# Patient Record
Sex: MALE | Race: WHITE
[De-identification: names, ages, dates, MRNs, and addresses within clinical notes are randomized per-mention and may not be internally consistent; named-entity substitution may affect disease eponyms.]

---

## 2023-09-18 ENCOUNTER — HOSPITAL ENCOUNTER
Dept: HOSPITAL 101 - MRI | Age: 23
Discharge: HOME | End: 2023-09-18
Payer: COMMERCIAL

## 2023-09-18 DIAGNOSIS — G43.909: Primary | ICD-10-CM

## 2023-09-18 DIAGNOSIS — R26.89: ICD-10-CM

## 2023-09-18 PROCEDURE — 70551 MRI BRAIN STEM W/O DYE: CPT

## 2023-09-18 NOTE — MR_ITS
The [address] [address] 
     [phone] 
  
  
Patient Name: 
KOFI LONDON 
  
MRN: [facility]:[id number]    YOB: 2000    Sex: M 
Assigned Patient Location: MRI 
Current Patient Location: MRI 
Accession/Order Number: [id number] 
Exam Date: 9/18/2023  08:57    Report Date: 9/18/2023  10:07 
  
At the request of: 
THOMAS BOYLE   
  
Procedure:  MR head/brain wo con 
  
MR head/brain wo con, 9/18/2023 8:57 AM EDT 
  
INDICATION: Migraine G43.909, Balance problem R26.89 
  
COMPARISON: There is no appropriate prior study for comparison. 
  
TECHNIQUE: Multiplanar, multisequential MRI images of brain were obtained  
without injection of contrast.  
  
FINDINGS: 
  
The cerebral sulci as well as ventricular system are appropriate for age. 
  
There is no restricted diffusion.  
  
There is no intracranial mass, mass effect, midline shift, intra or  
extra-axial  
fluid collection or large hemorrhage.  
  
Normal flow-void in the intracranial vessels is noted. 
Retention cyst within the right maxillary sinus are noted. 
The visualized portions of orbits, mastoid air cells as well as remainder of  
paranasal sinuses are unremarkable.  
  
ORDER #: [id number] MR/MR head/brain wo con  
IMPRESSION:  
   
Normal MRI of brain.  
   
   
Electronically authenticated by: CHANTAL MALAGON   Date: 9/18/2023  10:07

## 2023-11-14 ENCOUNTER — HOSPITAL ENCOUNTER (EMERGENCY)
Age: 23
Discharge: HOME | End: 2023-11-14
Payer: COMMERCIAL

## 2023-11-14 VITALS — SYSTOLIC BLOOD PRESSURE: 126 MMHG | HEART RATE: 80 BPM | DIASTOLIC BLOOD PRESSURE: 80 MMHG | RESPIRATION RATE: 18 BRPM

## 2023-11-14 VITALS
TEMPERATURE: 97.88 F | HEART RATE: 75 BPM | OXYGEN SATURATION: 100 % | RESPIRATION RATE: 18 BRPM | DIASTOLIC BLOOD PRESSURE: 97 MMHG | SYSTOLIC BLOOD PRESSURE: 153 MMHG

## 2023-11-14 VITALS — BODY MASS INDEX: 23.7 KG/M2

## 2023-11-14 DIAGNOSIS — R42: Primary | ICD-10-CM

## 2023-11-14 DIAGNOSIS — Z79.899: ICD-10-CM

## 2023-11-14 LAB
ADD MANUAL DIFF: NO
ANION GAP: 8.9
BLOOD UREA NITROGEN: 14 MG/DL (ref 7–18)
CALCIUM: 9.4 MG/DL (ref 8.5–10.1)
CARBON DIOXIDE: 29.9 MMOL/L (ref 21–32)
CHLORIDE: 104 MMOL/L (ref 98–107)
CO2 BLD-SCNC: 29.9 MMOL/L (ref 21–32)
ESTIMATED GFR (AFRICAN AMERICA: >60 (ref 60–?)
ESTIMATED GFR (NON-AFRICAN AME: >60 (ref 60–?)
GLUCOSE BLD-MCNC: 101 MG/DL (ref 74–106)
HCT VFR BLD CALC: 42.1 % (ref 42–54)
HEMATOCRIT: 42.1 % (ref 42–54)
HEMOGLOBIN: 14.6 G/DL (ref 14–18)
IMMATURE GRANULOCYTES ABS AUTO: 0.01 10^3/UL (ref 0–0.03)
IMMATURE GRANULOCYTES PCT AUTO: 0.2 % (ref 0–0.5)
LYMPHOCYTES  ABSOLUTE AUTO: 1.4 10^3/UL (ref 1.2–3.8)
MCV RBC: 88.3 FL (ref 80–94)
MEAN CORPUSCULAR HEMOGLOBIN: 30.6 PG (ref 25.9–34)
MEAN CORPUSCULAR HGB CONC: 34.7 G/DL (ref 29.9–35.2)
MEAN CORPUSCULAR VOLUME: 88.3 FL (ref 80–94)
PLATELET # BLD: 253 10^3/UL (ref 150–450)
PLATELET COUNT: 253 10^3/UL (ref 150–450)
POTASSIUM SERPLBLD-SCNC: 3.8 MMOL/L (ref 3.5–5.1)
POTASSIUM: 3.8 MMOL/L (ref 3.5–5.1)
RED BLOOD COUNT: 4.77 10^6/UL (ref 4.7–6.1)
SODIUM BLD-SCNC: 139 MMOL/L (ref 136–145)
SODIUM: 139 MMOL/L (ref 136–145)
WBC # BLD: 5.7 10^3/UL (ref 4–11)
WHITE BLOOD COUNT: 5.7 10^3/UL (ref 4–11)

## 2023-11-14 PROCEDURE — 85025 COMPLETE CBC W/AUTO DIFF WBC: CPT

## 2023-11-14 PROCEDURE — 93005 ELECTROCARDIOGRAM TRACING: CPT

## 2023-11-14 PROCEDURE — 70450 CT HEAD/BRAIN W/O DYE: CPT

## 2023-11-14 PROCEDURE — 99285 EMERGENCY DEPT VISIT HI MDM: CPT

## 2023-11-14 PROCEDURE — 36415 COLL VENOUS BLD VENIPUNCTURE: CPT

## 2023-11-14 PROCEDURE — 80048 BASIC METABOLIC PNL TOTAL CA: CPT

## 2023-11-14 NOTE — ECG_ITS
The [facility] 
                                        
                                       Test Date:    2023 
Pat Name:     KOFI LONDON              Department:    
Patient ID:   [id number]               Room:         - 
Gender:       Male                     Technician:    
:          2000               Requested By: [id number] 
Order Number: [id number]              Reading MD:   GHULAM CHAUDHARY 
                                 Measurements 
Intervals                              Axis           
Rate:         62                       P:            51 
MS:           136                      QRS:          53 
QRSD:         108                      T:            76 
QT:           370                                     
QTc:          374                                     
                           Interpretive Statements 
1100 Sinus rhythm 
2440 Incomplete right bundle branch block 
9130 **  borderline ECG  ** 
No previous ECG available for comparison 
Electronically Signed On 11- 6:56:29 EST by GHULAM CHAUDHARY

## 2023-11-14 NOTE — ED_ITS
HPI - Dizziness    
General    
Chief Complaint: Dizziness    
Stated Complaint: DIZZINESS    
Time Seen by Provider: 11/14/23 16:41    
Source: patient    
Mode of arrival: walk-in    
Limitations: no limitations    
History of Present Illness    
HPI Narrative:     
23-year-old male presents for intermittent dizziness that started the day before  
yesterday. No fever vomiting or diarrhea. No head injury. He doesn't complain of  
a headache. From time to time she feels like the whole room is spinning. No   
localized weakness or numbness. He had  similar episode about six years ago but   
did not go and have it checked.    
Related Data    
                                Home Medications    
    
    
    
 Medication  Instructions  Recorded  Confirmed    
     
atogepant 60 mg tablet (Qulipta) 60 mg PO DAILY 11/14/23 11/14/23    
    
    
                                  Previous Rx's    
    
    
    
 Medication  Instructions  Recorded    
     
meclizine 25 mg tablet 25 mg PO TID PRN dizziness #20 tabs 11/14/23    
    
    
    
                                    Allergies    
    
    
    
Allergy/AdvReac Type Severity Reaction Status Date / Time    
     
Penicillins Allergy Mild  Verified 11/14/23 16:43    
    
    
    
    
Review of Systems    
    
    
ROS      
    
 Narrative A ten point review of systems is negative except as noted above.       
    
    
Exam    
Narrative    
Exam Narrative:     
Nurses note and vital signs reviewed and patient is not hypoxic.    
    
General:  The patient appears well and in no apparent distress.  Patient is   
resting comfortably on cart.    
Skin:  Warm, dry, no pallor noted.  There is no rash noted.    
Head:  Normocephalic, atraumatic    
Eye: Normal conjunctiva, no drainage, EOMI. PERRL    
Ears, Nose, Mouth, and Throat: oral mucosa is moist. Nares patent. Tympanic   
membranes are normal in appearance.    
Cardiovascular:  Regular Rate and Rhythm    
Respiratory:  Patient is in no distress, no accessory muscle use, lungs are   
clear to auscultation, no wheezing, rales or rhonchi    
Back:  non-tender    
GI:  soft and nontender    
Musculoskeletal: The patient has no evidence of calf tenderness, no pitting   
edema, symmetrical pulses noted bilaterally    
Neurological:  A&O x4, normal speech; upper and lower extremity strength intact    
Psychiatric:  Cooperative    
Constitutional    
Vital Signs, click to edit/add:     
    
                                Last Vital Signs    
    
    
    
Temp  97.9 F   11/14/23 16:39    
     
Pulse  80   11/14/23 17:02    
     
Resp  18   11/14/23 17:02    
     
BP  126/80   11/14/23 17:02    
     
Pulse Ox  100   11/14/23 16:39    
     
O2 Del Method  Room Air  11/14/23 16:39    
    
    
    
    
    
Course    
Vital Signs    
Vital signs:     
    
                                   Vital Signs    
    
    
    
Temperature  97.9 F   11/14/23 16:39    
     
Pulse Rate  75   11/14/23 16:39    
     
Respiratory Rate  18   11/14/23 16:39    
     
Blood Pressure  153/97 H  11/14/23 16:39    
     
Pulse Oximetry  100   11/14/23 16:39    
     
Oxygen Delivery Method  Room Air  11/14/23 16:39    
    
    
                                            
    
    
    
Temperature  97.9 F   11/14/23 16:39    
     
Pulse Rate  80   11/14/23 17:02    
     
Respiratory Rate  18   11/14/23 17:02    
     
Blood Pressure  126/80   11/14/23 17:02    
     
Pulse Oximetry  100   11/14/23 16:39    
     
Oxygen Delivery Method  Room Air  11/14/23 16:39    
    
    
    
    
    
MDM - Dizziness    
MDM Narrative    
Medical decision making narrative:     
His workup including CT of the brain is negative. He'll be discharged home on   
Antivert. The patient happens to have a neurology appointment tomorrow and he'll  
keep that appointment. Treatment diagnosis and follow-up were discussed with the  
patient.    
Differential Diagnosis    
Differential diagnosis: Likely benign paroxysmal positional vertigo and other   
(dehydration, acute kidney injury, anemia, dysrhythmia)    
Lab Data    
Attestation: I reviewed the patient's lab results.    
Labs:     
    
                                   Lab Results    
    
    
    
  11/14/23 Range/Units    
    
  16:54     
     
WBC  5.7  (4.0-11.0)  10^3/uL    
     
RBC  4.77  (4.70-6.10)  10^6/uL    
     
Hgb  14.6  (14.0-18.0)  g/dL    
     
Hct  42.1  (42.0-54.0)  %    
     
MCV  88.3  (80.0-94.0)  fL    
     
MCH  30.6  (25.9-34.0)  pg    
     
MCHC  34.7  (29.9-35.2)  g/dL    
     
RDW  11.5  (11.0-15.0)  %    
     
Plt Count  253  (150-450)  10^3/uL    
     
MPV  9.9  (9.5-13.5)  fL    
     
Neut % (Auto)  61.7  (43.0-75.0)  %    
     
Lymph % (Auto)  25.3  (20.5-60.0)  %    
     
Mono % (Auto)  8.0  (1.7-12.0)  %    
     
Eos % (Auto)  3.7  (0.9-7.0)  %    
     
Baso % (Auto)  1.1  (0.2-2.0)  %    
     
Neut # (Auto)  3.5  (1.4-6.5)  10^3/uL    
     
Lymph # (Auto)  1.4  (1.2-3.8)  10^3/uL    
     
Mono # (Auto)  0.5  (0.3-0.8)  10^3/uL    
     
Eos # (Auto)  0.2  (0.0-0.7)  10^3/uL    
     
Baso # (Auto)  0.1  (0.0-0.1)  10^3/uL    
     
Abs Immat Gran (auto)  0.01  (0.00-0.03)  10^3/uL    
     
Imm/Tot Granulo (auto)  0.2  (0.0-0.5)  %    
     
Sodium  139  (136-145)  mmol/L    
     
Potassium  3.8  (3.5-5.1)  mmol/L    
     
Chloride  104  ()  mmol/L    
     
Carbon Dioxide  29.9  (21.0-32.0)  mmol/L    
     
Anion Gap  8.9      
     
BUN  14.0  (7.0-18.0)  mg/dL    
     
Creatinine  0.99  (0.70-1.30)  mg/dL    
     
Est GFR ( Amer)  >60  (>=60)      
     
Est GFR (Non-Af Amer)  >60  (>=60)      
     
BUN/Creatinine Ratio  14.1      
     
Glucose  101  ()  mg/dL    
     
Calcium  9.4  (8.5-10.1)  mg/dL    
    
    
    
    
Imaging Data    
CT scan - head:     
      Radiologist's impression:     
Procedure:  CT head/brain wo con    
EXAM: CT head/brain wo con     
HISTORY: Vertigo    
COMPARISON: MRI    
 brain 9/18/2023    
TECHNIQUE: Axial CT scans through    
 the head were obtained    
 without IV contrast    
administration. Dose reduction techniques were    
 achieved by using: automated     
exposure control and/or    
 adjustment of mA and /or kV according to patient    
 size    
and/or use of iterative reconstruction technique.    
FINDINGS:     
There is no acute intracranial hemorrhage    
 or abnormal extra-axial    
 fluid     
collection. No mass effect or midline    
 shift is    
 seen.     
There is no evidence of large acute territorial    
 infarction. There is no     
hydrocephalus.     
To the limit of    
 CT, the posterior fossa appears unremarkable.     
The calvaria    
 and extra cranial soft tissues    
 are    
 unremarkable. The visualized     
orbits show no abnormal mass.     
The visualized paranasal sinuses show no air-fluid    
 level. Mastoid air    
 cells are     
clear.    
IMPRESSION:     
Unremarkable CT brain performed without contrast    
    
    
Electronically authenticated by: WINNIE EGAN   Date: 11/14/2023  17:42    
ECG Data    
Attestation: I personally reviewed and interpreted this ECG as follows: (EKG on   
my interpretation shows normal sinus rhythm with a rate of 62.)    
    
Discharge Plan    
Discharge    
Chief Complaint: Dizziness    
    
Clinical Impression:    
 Vertigo    
    
    
Patient Disposition: Home, Self-Care    
    
Time of Disposition Decision: 18:25    
    
Condition: Good    
    
Mode of Transportation: Private Vehicle    
    
Prescriptions / Home Meds:    
New    
  meclizine 25 mg tablet     
   25 mg PO TID PRN (Reason: dizziness) Qty: 20 0RF    
    
No Action    
  Qulipta 60 mg tablet     
   60 mg PO DAILY     
    
Instructions:  Vertigo (ED)    
    
Stand Alone Forms:  Portal Instructions    
    
Referrals:    
Nguyễn Kelley DO [Primary Care Provider] - 1 week

## 2023-11-14 NOTE — CT_ITS
The [address] [address] 
     [phone] 
  
  
Patient Name: 
KOFI LONDON 
  
MRN: [id number]    YOB: 2000    Sex: M 
Assigned Patient Location: ED.MAIN 
Current Patient Location:  
Accession/Order Number: [id number] 
Exam Date: 11/14/2023  17:16    Report Date: 11/14/2023  17:42 
  
At the request of: 
LIBIA LENNON   
  
Procedure:  CT head/brain wo con 
  
EXAM: CT head/brain wo con  
  
HISTORY: Vertigo 
  
COMPARISON: MRI brain 9/18/2023 
  
TECHNIQUE: Axial CT scans through the head were obtained without IV contrast  
administration. Dose reduction techniques were achieved by using: automated  
exposure control and/or adjustment of mA and /or kV according to patient size  
and/or use of iterative reconstruction technique. 
  
  
  
FINDINGS:  
  
There is no acute intracranial hemorrhage or abnormal extra-axial fluid  
collection. No mass effect or midline shift is seen.  
  
There is no evidence of large acute territorial infarction. There is no  
hydrocephalus.  
  
To the limit of CT, the posterior fossa appears unremarkable.  
  
The calvaria and extra cranial soft tissues are unremarkable. The visualized  
orbits show no abnormal mass.  
  
The visualized paranasal sinuses show no air-fluid level. Mastoid air cells  
are  
clear. 
  
  
ORDER #: [id number] CT/CT head/brain wo con  
IMPRESSION:   
   
Unremarkable CT brain performed without contrast  
   
   
Electronically authenticated by: WINNIE EGAN   Date: 11/14/2023  17:42

## 2024-08-02 ENCOUNTER — HOSPITAL ENCOUNTER
Dept: HOSPITAL 101 - MRI | Age: 24
Discharge: HOME | End: 2024-08-02
Payer: COMMERCIAL

## 2024-08-02 DIAGNOSIS — J32.8: ICD-10-CM

## 2024-08-02 DIAGNOSIS — G43.909: Primary | ICD-10-CM

## 2024-08-02 DIAGNOSIS — R42: ICD-10-CM

## 2024-08-02 PROCEDURE — 70551 MRI BRAIN STEM W/O DYE: CPT

## 2024-08-02 NOTE — MR_ITS
The [address] [address] 
     [phone] 
  
  
Patient Name: 
KOFI LONDON 
  
MRN: [id number]    YOB: 2000    Sex: M 
Assigned Patient Location: MRI 
Current Patient Location:   
Accession/Order Number: [id number] 
Exam Date: 8/02/2024  14:00    Report Date: 8/03/2024  06:48 
  
At the request of: 
THOMAS BOYLE   
  
Procedure:  MR head/brain wo con 
  
EXAMINATION: MR head/brain wo con  
  
HISTORY: Migraine Without Status Migrainosus, Dizziness 
  
COMPARISON: MR head/brain 9/18/2023 
  
TECHNIQUE: A variety of imaging planes and parameters were utilized for  
visualization of suspected pathology. Images were performed without contrast. 
  
FINDINGS: 
CEREBRUM: No edema, hemorrhage, mass, acute infarction, or inappropriate  
atrophy.  
CEREBELLUM: No edema, hemorrhage, mass, acute infarction, or inappropriate  
atrophy.  
BRAINSTEM: No edema, hemorrhage, mass, acute infarction, or inappropriate  
atrophy.  
CSF SPACES: Ventricles, cisterns, and sulci are appropriate for age. No  
hydrocephalus, subarachnoid hemorrhage, or mass.  
SKULL: No mass or other significant visible lesion.  
SINUSES: A few tiny mucocele/retention cysts within the paranasal sinuses.  
ORBITS: Limited views are unremarkable.  
OTHER: Negative.  
  
ORDER #: [id number] MR/MR head/brain wo con  
IMPRESSION:   
   
1. Mild chronic sinusitis.  
2. No suspicious abnormality of the brain.  
   
   
Electronically authenticated by: MYRIAM SRINIVASAN   Date: 8/03/2024  06:48